# Patient Record
Sex: MALE | Race: WHITE | NOT HISPANIC OR LATINO | Employment: OTHER | URBAN - METROPOLITAN AREA
[De-identification: names, ages, dates, MRNs, and addresses within clinical notes are randomized per-mention and may not be internally consistent; named-entity substitution may affect disease eponyms.]

---

## 2022-04-25 ENCOUNTER — TELEPHONE (OUTPATIENT)
Dept: GASTROENTEROLOGY | Facility: CLINIC | Age: 72
End: 2022-04-25

## 2022-04-25 NOTE — TELEPHONE ENCOUNTER
Pt was calling to ask if there was any paperwork that would need to be filled out before appt  I told him, there would be a few things for him to sign when he comes in for appt

## 2022-04-28 ENCOUNTER — OFFICE VISIT (OUTPATIENT)
Dept: GASTROENTEROLOGY | Facility: CLINIC | Age: 72
End: 2022-04-28
Payer: MEDICARE

## 2022-04-28 VITALS
SYSTOLIC BLOOD PRESSURE: 172 MMHG | HEIGHT: 64 IN | BODY MASS INDEX: 23.18 KG/M2 | HEART RATE: 70 BPM | WEIGHT: 135.8 LBS | DIASTOLIC BLOOD PRESSURE: 91 MMHG

## 2022-04-28 DIAGNOSIS — R10.9 CHRONIC ABDOMINAL PAIN: Primary | ICD-10-CM

## 2022-04-28 DIAGNOSIS — G89.29 CHRONIC ABDOMINAL PAIN: Primary | ICD-10-CM

## 2022-04-28 PROBLEM — R10.10 CHRONIC UPPER ABDOMINAL PAIN: Status: ACTIVE | Noted: 2022-04-28

## 2022-04-28 PROCEDURE — 99204 OFFICE O/P NEW MOD 45 MIN: CPT | Performed by: INTERNAL MEDICINE

## 2022-04-28 RX ORDER — ALBUTEROL SULFATE 2.5 MG/3ML
3 SOLUTION RESPIRATORY (INHALATION) 3 TIMES DAILY
COMMUNITY

## 2022-04-28 RX ORDER — OXYCODONE AND ACETAMINOPHEN 7.5; 325 MG/1; MG/1
TABLET ORAL
COMMUNITY
Start: 2022-04-15

## 2022-04-28 RX ORDER — ALPHA LIPOIC ACID 300 MG
300 CAPSULE ORAL 2 TIMES DAILY
COMMUNITY

## 2022-04-28 RX ORDER — METOPROLOL SUCCINATE 100 MG/1
TABLET, EXTENDED RELEASE ORAL
COMMUNITY
Start: 2022-03-30

## 2022-04-28 RX ORDER — PANTOPRAZOLE SODIUM 40 MG/1
40 TABLET, DELAYED RELEASE ORAL DAILY
COMMUNITY

## 2022-04-28 RX ORDER — UBIDECARENONE 200 MG
200 CAPSULE ORAL DAILY
COMMUNITY

## 2022-04-28 RX ORDER — ASPIRIN 81 MG/1
81 TABLET ORAL DAILY
COMMUNITY

## 2022-04-28 RX ORDER — ALIROCUMAB 75 MG/ML
INJECTION, SOLUTION SUBCUTANEOUS
COMMUNITY
Start: 2022-04-12

## 2022-04-28 RX ORDER — OXYCODONE 36 MG/1
CAPSULE, EXTENDED RELEASE ORAL
COMMUNITY
Start: 2022-04-15

## 2022-04-28 RX ORDER — NALOXONE HYDROCHLORIDE 4 MG/.1ML
SPRAY NASAL
COMMUNITY

## 2022-04-28 RX ORDER — NITROGLYCERIN 400 UG/1
1 SPRAY ORAL
COMMUNITY

## 2022-04-28 RX ORDER — CLOPIDOGREL BISULFATE 75 MG/1
TABLET ORAL
COMMUNITY
Start: 2022-03-29

## 2022-04-28 RX ORDER — PERPHENAZINE/AMITRIPTYLINE HCL 4MG-10MG
TABLET ORAL
COMMUNITY

## 2022-04-28 RX ORDER — FLUTICASONE PROPIONATE 50 MCG
1 SPRAY, SUSPENSION (ML) NASAL DAILY
COMMUNITY

## 2022-04-28 RX ORDER — CYCLOBENZAPRINE HCL 10 MG
TABLET ORAL
COMMUNITY
Start: 2022-04-12

## 2022-04-28 RX ORDER — MELATONIN 10 MG
20 CAPSULE ORAL DAILY
COMMUNITY

## 2022-04-28 RX ORDER — FOLIC ACID 0.8 MG
1 TABLET ORAL 2 TIMES DAILY
COMMUNITY

## 2022-04-28 RX ORDER — IPRATROPIUM/ALBUTEROL SULFATE 20-100 MCG
1 MIST INHALER (GRAM) INHALATION DAILY
COMMUNITY

## 2022-04-28 RX ORDER — LISINOPRIL 40 MG/1
TABLET ORAL
COMMUNITY
Start: 2022-03-29

## 2022-04-28 NOTE — PROGRESS NOTES
[facility] - Outpatient Consultation  Farida Villalba 66 y o  male MRN: [id number]  Encounter: [id number]          ASSESSMENT AND PLAN:      1  Chronic abdominal pain  Chronic abdominal pain question etiology cannot exclude adhesive disease  Doubt this is metabolic due to the pinpoint nature of it  There does not appear to be a hernia  He will follow up after his scheduled celiac plexus block     ______________________________________________________________________    HPI:  79-year-old gentleman who presents with chronic abdominal pain which started around 2014  His pain is almost pinpoint localized a little bit superior and to the right of the umbilicus  He states there is no hernia there  He has had extensive workups in the past he has had endoscopy, colonoscopy, ERCP with sphincterotomy  In September of last year had laparoscopy exploratory in nature with lysis of adhesions  In June of last year had a capsule endoscopy  Indicates CT scan was done last year  When asked about what makes the pain better or worse with eating and having a bowel movement makes the pain worse  There is no change with position  It comes and goes in waves  Tells me he has lost 20 lb over the past year  He occasionally has diarrhea but he takes magnesium and other diarrheal agents to keep his bowels going because he is on chronic narcotics  He had an abdominal wall injection  Tells me he has had an abdominal stent placed 5 years ago  His chart indicates an aortic stent  He is due for a celiac plexus block in the near future  He is followed by pain management at [facility]  And has seen multiple gastroenterologist         REVIEW OF SYSTEMS:    CONSTITUTIONAL: Denies any fever, chills, rigors, and weight loss  HEENT: No earache or tinnitus  Denies hearing loss or visual disturbances  CARDIOVASCULAR: No chest pain or palpitations     RESPIRATORY: Denies any cough, hemoptysis, shortness of breath or dyspnea on exertion  GASTROINTESTINAL: As noted in the History of Present Illness  GENITOURINARY: No problems with urination  Denies any hematuria or dysuria  NEUROLOGIC: No dizziness or vertigo, denies headaches  MUSCULOSKELETAL: Denies any muscle or joint pain  SKIN: Denies skin rashes or itching  ENDOCRINE: Denies excessive thirst  Denies intolerance to heat or cold  PSYCHOSOCIAL: Denies depression or anxiety  Denies any recent memory loss  Historical Information   Past Medical History:   Diagnosis Date    Asthma     [facility]) ([facility] )     Diabetes mellitus ([place] [address] )     Hypertension      Past Surgical History:   Procedure Laterality Date    ABDOMINAL AORTA STENT      ANKLE FUSION      CATARACT EXTRACTION      CHOLECYSTECTOMY      CORONARY ANGIOPLASTY WITH STENT PLACEMENT      SKIN CANCER EXCISION      SUBCLAVIAN ARTERY STENT      TONSILLECTOMY       Social History   Social History     Substance and Sexual Activity   Alcohol Use Not Currently     Social History     Substance and Sexual Activity   Drug Use Never     Social History     Tobacco Use   Smoking Status Former Smoker    Types: Cigarettes   Smokeless Tobacco Never Used     History reviewed  No pertinent family history      Meds/Allergies       Current Outpatient Medications:     albuterol (2 5 mg/3 mL) 0 083 % nebulizer solution    Alpha-Lipoic Acid 300 MG CAPS    aspirin (ECOTRIN LOW STRENGTH) 81 mg EC tablet    clopidogrel (PLAVIX) 75 mg tablet    Coenzyme Q10 200 MG capsule    cyclobenzaprine (FLEXERIL) 10 mg tablet    fluticasone (FLONASE) 50 mcg/act nasal spray    ipratropium-albuterol (Combivent Respimat) inhaler    Krill Oil Ultra Strength 1500 MG CAPS    lisinopril (ZESTRIL) 40 mg tablet    Magnesium 500 MG CAPS    Melatonin 10 MG CAPS    metoprolol succinate (TOPROL-XL) 100 mg 24 hr tablet    naloxone (NARCAN) 4 mg/0 1 mL nasal spray    nitroglycerin (NITROLINGUAL) 0 4 mg/spray spray   oxyCODONE-acetaminophen (PERCOCET) 7 5-325 MG per tablet    pantoprazole (PROTONIX) 40 mg tablet    Praluent 75 MG/ML SOAJ    Xtampza ER 36 MG C12A    Allergies   Allergen Reactions    Duloxetine Swelling    Diphenhydramine Palpitations    Pseudoephedrine Palpitations    Celecoxib Other (See Comments)     Palpitations    Furosemide Other (See Comments)    Gabapentin Swelling    Pregabalin Swelling    Simvastatin Other (See Comments)    Zinc Acetate Other (See Comments)    Hydrochlorothiazide Rash    Latex Rash    Statins Palpitations     Muscle pain weakness           Objective     Blood pressure (!) 172/91, pulse 70, height 5' 4" (1 626 m), weight 61 6 kg (135 lb 12 8 oz)  Body mass index is 23 31 kg/m²  PHYSICAL EXAM:      General Appearance:   Alert, cooperative, no distress   HEENT:   Normocephalic, atraumatic, anicteric      Neck:  Supple, symmetrical, trachea midline   Lungs:   Clear to auscultation bilaterally; no rales, rhonchi or wheezing; respirations unlabored    Heart[de-identified]   Regular rate and rhythm; no murmur, rub, or gallop  Abdomen:   Soft, -tender a little to the right and superior to the umbilicus  No peritoneal signs  , non-distended; normal bowel sounds; no masses, no organomegaly , Thera laparoscopic surgical sites well healed  I do not feel any hernias  Genitalia:   Deferred    Rectal:   Deferred    Extremities:  No cyanosis, clubbing or edema    Pulses:  2+ and symmetric    Skin:  No jaundice, rashes, or lesions    Lymph nodes:  No palpable cervical lymphadenopathy        Lab Results:   No visits with results within 1 Day(s) from this visit  Latest known visit with results is:   No results found for any previous visit  Radiology Results:   No results found